# Patient Record
Sex: MALE | URBAN - METROPOLITAN AREA
[De-identification: names, ages, dates, MRNs, and addresses within clinical notes are randomized per-mention and may not be internally consistent; named-entity substitution may affect disease eponyms.]

---

## 2022-09-16 ENCOUNTER — EMERGENCY (EMERGENCY)
Facility: HOSPITAL | Age: 27
LOS: 1 days | Discharge: HOME | End: 2022-09-16
Payer: COMMERCIAL

## 2022-09-16 VITALS
HEART RATE: 86 BPM | SYSTOLIC BLOOD PRESSURE: 137 MMHG | OXYGEN SATURATION: 100 % | RESPIRATION RATE: 17 BRPM | DIASTOLIC BLOOD PRESSURE: 84 MMHG | WEIGHT: 175.05 LBS | HEIGHT: 65 IN | TEMPERATURE: 98 F

## 2022-09-16 PROCEDURE — 99284 EMERGENCY DEPT VISIT MOD MDM: CPT

## 2022-09-16 PROCEDURE — 99283 EMERGENCY DEPT VISIT LOW MDM: CPT

## 2022-09-16 RX ORDER — LIDOCAINE 4 G/100G
1 CREAM TOPICAL
Qty: 7 | Refills: 0
Start: 2022-09-16 | End: 2022-09-22

## 2022-09-16 RX ORDER — DIAZEPAM 5 MG
1 TABLET ORAL
Qty: 9 | Refills: 0
Start: 2022-09-16 | End: 2022-09-18

## 2022-09-16 RX ORDER — LIDOCAINE 4 G/100G
1 CREAM TOPICAL ONCE
Refills: 0 | Status: COMPLETED | OUTPATIENT
Start: 2022-09-16 | End: 2022-09-16

## 2022-09-16 RX ORDER — DIAZEPAM 5 MG
5 TABLET ORAL ONCE
Refills: 0 | Status: DISCONTINUED | OUTPATIENT
Start: 2022-09-16 | End: 2022-09-16

## 2022-09-16 RX ORDER — ACETAMINOPHEN 500 MG
975 TABLET ORAL ONCE
Refills: 0 | Status: COMPLETED | OUTPATIENT
Start: 2022-09-16 | End: 2022-09-16

## 2022-09-16 RX ADMIN — Medication 975 MILLIGRAM(S): at 13:46

## 2022-09-16 RX ADMIN — Medication 5 MILLIGRAM(S): at 12:56

## 2022-09-16 RX ADMIN — LIDOCAINE 1 PATCH: 4 CREAM TOPICAL at 12:57

## 2022-09-16 RX ADMIN — Medication 975 MILLIGRAM(S): at 12:56

## 2022-09-16 NOTE — ED PROVIDER NOTE - PHYSICAL EXAMINATION
GENERAL: uncomfortable appearing from pain.   HEAD:  Atraumatic, Normocephalic  EYES: EOMI, PERRLA, conjunctiva and sclera clear  ENT: MMM; oropharynx clear  NECK: Supple, No JVD  CHEST/LUNG: Clear to auscultation bilaterally; No wheeze  HEART: Regular rate and rhythm; No murmurs, rubs, or gallops  ABDOMEN: Soft, Nontender, Nondistended; Bowel sounds present  EXTREMITIES:  2+ Peripheral Pulses, No clubbing, cyanosis, or edema  PSYCH: AAOx3  NEUROLOGY: no focal motor or sensory deficits. 5/5 muscle strength in all extremities.   SKIN: No rashes or lesions  BACK: no midline spinal tenderness; exquisite tenderness to superior right gluteus and lower right back.

## 2022-09-16 NOTE — ED PROVIDER NOTE - CLINICAL SUMMARY MEDICAL DECISION MAKING FREE TEXT BOX
25 y/o M hx of HTN, HLD, anxiety, ADHD, Depression presenting with sudden onset of right lower back and buttock pain since yesterday.   Suspect spasm/radicular sciatic pain.   No worrisome features of cauda equina. Lower extremity strength preserved.  Plan for analgesics and antispasmodics and reassessment.

## 2022-09-16 NOTE — ED PROVIDER NOTE - PATIENT PORTAL LINK FT
You can access the FollowMyHealth Patient Portal offered by [facility] by registering at the following website: [email or web address]. By joining [employer]’s FollowMyHealth portal, you will also be able to view your health information using other applications (apps) compatible with our system.

## 2022-09-16 NOTE — ED ADULT NURSE NOTE - OBJECTIVE STATEMENT
States he has lower back pain radiating to right lower pain since yesterday .Denies injury . States he has lower back pain radiating to right lower pain since yesterday .Denies injury or fall.

## 2022-09-16 NOTE — ED PROVIDER NOTE - NSFOLLOWUPINSTRUCTIONS_ED_ALL_ED_FT
There are spine specialists within the [facility] system you may call [phone] for your back pain, call and arrange your follow up appointment.  Please also follow up with your primary care doctor.  Take Tylenol 650 mg every 6-8 hours or Motrin 600 mg every 8 hours as need for pain  Use lidocaine patch every 12-24 hours.   Take diazepam 5 mg every 8 hours as needed for breakthrough pain. Do not drive on this medication.  Return to the emergency department for any difficulty urinating or worsening weakness of your legs.

## 2022-09-16 NOTE — ED PROVIDER NOTE - PROGRESS NOTE DETAILS
Patient feels much improved. Will provide short course of antispasmodics and recommend follow up with primary care and spine specialist. Able to ambulate better.

## 2022-09-16 NOTE — ED PROVIDER NOTE - NSICDXPASTMEDICALHX_GEN_ALL_CORE_FT
PAST MEDICAL HISTORY:  Anxiety     Anxiety and depression     HTN (hypertension)     Hyperlipidemia

## 2022-09-19 PROBLEM — Z00.00 ENCOUNTER FOR PREVENTIVE HEALTH EXAMINATION: Status: ACTIVE | Noted: 2022-09-19

## 2022-09-19 PROBLEM — F41.9 ANXIETY DISORDER, UNSPECIFIED: Chronic | Status: ACTIVE | Noted: 2022-09-16

## 2022-09-19 PROBLEM — I10 ESSENTIAL (PRIMARY) HYPERTENSION: Chronic | Status: ACTIVE | Noted: 2022-09-16

## 2022-09-19 PROBLEM — E78.5 HYPERLIPIDEMIA, UNSPECIFIED: Chronic | Status: ACTIVE | Noted: 2022-09-16

## 2022-09-26 ENCOUNTER — NON-APPOINTMENT (OUTPATIENT)
Age: 27
End: 2022-09-26

## 2022-09-26 ENCOUNTER — APPOINTMENT (OUTPATIENT)
Dept: ORTHOPEDIC SURGERY | Facility: CLINIC | Age: 27
End: 2022-09-26

## 2022-09-26 DIAGNOSIS — M54.9 DORSALGIA, UNSPECIFIED: ICD-10-CM

## 2022-09-26 PROCEDURE — 72110 X-RAY EXAM L-2 SPINE 4/>VWS: CPT

## 2022-09-26 PROCEDURE — 99204 OFFICE O/P NEW MOD 45 MIN: CPT

## 2022-09-26 RX ORDER — IBUPROFEN 800 MG/1
800 TABLET, FILM COATED ORAL 3 TIMES DAILY
Qty: 28 | Refills: 0 | Status: ACTIVE | COMMUNITY
Start: 2022-09-26 | End: 1900-01-01

## 2022-09-26 RX ORDER — TIZANIDINE 2 MG/1
2 TABLET ORAL EVERY 6 HOURS
Qty: 24 | Refills: 1 | Status: ACTIVE | COMMUNITY
Start: 2022-09-26 | End: 1900-01-01

## 2022-09-26 NOTE — PHYSICAL EXAM
[de-identified] : Lumbar Physical Exam\par \par Gait - Normal\par \par Station - Normal\par \par Sagittal balance - Normal\par \par Compensatory mechanism? - None\par \par Heel walk - Normal\par \par Toe walk - Normal\par \par Reflexes\par Patellar - normal\par Gastroc - normal\par Clonus - No\par \par Hip Exam - Normal\par \par Straight leg raise - none\par \par Pulses - 2+ dp/pt\par \par Range of motion - normal\par \par Sensation \par Sensation intact to light touch in L1, L2, L3, L4, L5 and S1 dermatomes bilaterally\par \par Motor\par 	IP	Quad	HS	TA	Gastroc	EHL\par Right	3/5	5/5	5/5	5/5	5/5	5/5\par Left	5/5	5/5	5/5	5/5	5/5	5/5 [de-identified] : Lumbar radiographs\par No instability noted\par Facet arthropathy noted\par Mild disc degeneration noted

## 2022-09-26 NOTE — HISTORY OF PRESENT ILLNESS
[de-identified] : This is a 26-year-old male who is here for evaluation of his back and leg symptoms.  He has been dealing with the symptoms off and on for several years.  He describes pain which does go down his right posterior thigh posterior calf.  He denies any bowel bladder issues.  Denies any saddle anesthesia.  He does state that the symptoms can be disabling at times.  They have interfered with his ability perform his activity daily living.

## 2022-10-25 ENCOUNTER — APPOINTMENT (OUTPATIENT)
Dept: MRI IMAGING | Facility: IMAGING CENTER | Age: 27
End: 2022-10-25

## 2022-11-14 ENCOUNTER — APPOINTMENT (OUTPATIENT)
Dept: ORTHOPEDIC SURGERY | Facility: CLINIC | Age: 27
End: 2022-11-14

## 2024-12-10 ENCOUNTER — EMERGENCY (EMERGENCY)
Facility: HOSPITAL | Age: 29
LOS: 1 days | Discharge: HOME | End: 2024-12-10
Attending: EMERGENCY MEDICINE
Payer: SELF-PAY

## 2024-12-10 VITALS
HEART RATE: 150 BPM | HEIGHT: 64 IN | SYSTOLIC BLOOD PRESSURE: 141 MMHG | RESPIRATION RATE: 18 BRPM | OXYGEN SATURATION: 100 % | WEIGHT: 220.46 LBS | DIASTOLIC BLOOD PRESSURE: 65 MMHG | TEMPERATURE: 99 F

## 2024-12-10 LAB
ALBUMIN SERPL ELPH-MCNC: 3.9 G/DL — SIGNIFICANT CHANGE UP (ref 3.5–5)
ALP SERPL-CCNC: 94 U/L — SIGNIFICANT CHANGE UP (ref 40–120)
ALT FLD-CCNC: 66 U/L DA — HIGH (ref 10–60)
ANION GAP SERPL CALC-SCNC: 4 MMOL/L — LOW (ref 5–17)
ANISOCYTOSIS BLD QL: SLIGHT — SIGNIFICANT CHANGE UP
APTT BLD: 34 SEC — SIGNIFICANT CHANGE UP (ref 24.5–35.6)
AST SERPL-CCNC: 20 U/L — SIGNIFICANT CHANGE UP (ref 10–40)
BASOPHILS # BLD AUTO: 0.13 K/UL — SIGNIFICANT CHANGE UP (ref 0–0.2)
BASOPHILS NFR BLD AUTO: 1 % — SIGNIFICANT CHANGE UP (ref 0–2)
BILIRUB SERPL-MCNC: 0.4 MG/DL — SIGNIFICANT CHANGE UP (ref 0.2–1.2)
BUN SERPL-MCNC: 13 MG/DL — SIGNIFICANT CHANGE UP (ref 7–18)
CALCIUM SERPL-MCNC: 9.4 MG/DL — SIGNIFICANT CHANGE UP (ref 8.4–10.5)
CHLORIDE SERPL-SCNC: 108 MMOL/L — SIGNIFICANT CHANGE UP (ref 96–108)
CK SERPL-CCNC: 111 U/L — SIGNIFICANT CHANGE UP (ref 35–232)
CO2 SERPL-SCNC: 25 MMOL/L — SIGNIFICANT CHANGE UP (ref 22–31)
CREAT SERPL-MCNC: 1.03 MG/DL — SIGNIFICANT CHANGE UP (ref 0.5–1.3)
EGFR: 100 ML/MIN/1.73M2 — SIGNIFICANT CHANGE UP
EOSINOPHIL # BLD AUTO: 0.13 K/UL — SIGNIFICANT CHANGE UP (ref 0–0.5)
EOSINOPHIL NFR BLD AUTO: 1 % — SIGNIFICANT CHANGE UP (ref 0–6)
GLUCOSE SERPL-MCNC: 109 MG/DL — HIGH (ref 70–99)
HCT VFR BLD CALC: 44.9 % — SIGNIFICANT CHANGE UP (ref 39–50)
HGB BLD-MCNC: 15.4 G/DL — SIGNIFICANT CHANGE UP (ref 13–17)
INR BLD: 1.02 RATIO — SIGNIFICANT CHANGE UP (ref 0.85–1.16)
LYMPHOCYTES # BLD AUTO: 1.34 K/UL — SIGNIFICANT CHANGE UP (ref 1–3.3)
LYMPHOCYTES # BLD AUTO: 10 % — LOW (ref 13–44)
MANUAL SMEAR VERIFICATION: SIGNIFICANT CHANGE UP
MCHC RBC-ENTMCNC: 30.4 PG — SIGNIFICANT CHANGE UP (ref 27–34)
MCHC RBC-ENTMCNC: 34.3 G/DL — SIGNIFICANT CHANGE UP (ref 32–36)
MCV RBC AUTO: 88.7 FL — SIGNIFICANT CHANGE UP (ref 80–100)
MONOCYTES # BLD AUTO: 1.07 K/UL — HIGH (ref 0–0.9)
MONOCYTES NFR BLD AUTO: 8 % — SIGNIFICANT CHANGE UP (ref 2–14)
MYELOCYTES NFR BLD: 1 % — HIGH (ref 0–0)
NEUTROPHILS # BLD AUTO: 9.35 K/UL — HIGH (ref 1.8–7.4)
NEUTROPHILS NFR BLD AUTO: 70 % — SIGNIFICANT CHANGE UP (ref 43–77)
NRBC # BLD: 0 /100 WBCS — SIGNIFICANT CHANGE UP (ref 0–0)
PLAT MORPH BLD: NORMAL — SIGNIFICANT CHANGE UP
PLATELET # BLD AUTO: 204 K/UL — SIGNIFICANT CHANGE UP (ref 150–400)
POIKILOCYTOSIS BLD QL AUTO: SLIGHT — SIGNIFICANT CHANGE UP
POTASSIUM SERPL-MCNC: 4 MMOL/L — SIGNIFICANT CHANGE UP (ref 3.5–5.3)
POTASSIUM SERPL-SCNC: 4 MMOL/L — SIGNIFICANT CHANGE UP (ref 3.5–5.3)
PROT SERPL-MCNC: 8.3 G/DL — SIGNIFICANT CHANGE UP (ref 6–8.3)
PROTHROM AB SERPL-ACNC: 11.9 SEC — SIGNIFICANT CHANGE UP (ref 9.9–13.4)
RBC # BLD: 5.06 M/UL — SIGNIFICANT CHANGE UP (ref 4.2–5.8)
RBC # FLD: 12.2 % — SIGNIFICANT CHANGE UP (ref 10.3–14.5)
RBC BLD AUTO: ABNORMAL
SODIUM SERPL-SCNC: 137 MMOL/L — SIGNIFICANT CHANGE UP (ref 135–145)
T4 AB SER-ACNC: 11.1 UG/DL — SIGNIFICANT CHANGE UP (ref 4.6–12)
TROPONIN I, HIGH SENSITIVITY RESULT: 3.6 NG/L — SIGNIFICANT CHANGE UP
TSH SERPL-MCNC: 0.93 UU/ML — SIGNIFICANT CHANGE UP (ref 0.34–4.82)
VARIANT LYMPHS # BLD: 9 % — HIGH (ref 0–6)
WBC # BLD: 13.36 K/UL — HIGH (ref 3.8–10.5)
WBC # FLD AUTO: 13.36 K/UL — HIGH (ref 3.8–10.5)

## 2024-12-10 PROCEDURE — 36415 COLL VENOUS BLD VENIPUNCTURE: CPT

## 2024-12-10 PROCEDURE — 73630 X-RAY EXAM OF FOOT: CPT

## 2024-12-10 PROCEDURE — 93971 EXTREMITY STUDY: CPT | Mod: 26,LT

## 2024-12-10 PROCEDURE — 84443 ASSAY THYROID STIM HORMONE: CPT

## 2024-12-10 PROCEDURE — 84439 ASSAY OF FREE THYROXINE: CPT

## 2024-12-10 PROCEDURE — 85730 THROMBOPLASTIN TIME PARTIAL: CPT

## 2024-12-10 PROCEDURE — 96375 TX/PRO/DX INJ NEW DRUG ADDON: CPT

## 2024-12-10 PROCEDURE — 93005 ELECTROCARDIOGRAM TRACING: CPT

## 2024-12-10 PROCEDURE — 80053 COMPREHEN METABOLIC PANEL: CPT

## 2024-12-10 PROCEDURE — 82550 ASSAY OF CK (CPK): CPT

## 2024-12-10 PROCEDURE — 99285 EMERGENCY DEPT VISIT HI MDM: CPT

## 2024-12-10 PROCEDURE — 93971 EXTREMITY STUDY: CPT

## 2024-12-10 PROCEDURE — 73610 X-RAY EXAM OF ANKLE: CPT

## 2024-12-10 PROCEDURE — 85610 PROTHROMBIN TIME: CPT

## 2024-12-10 PROCEDURE — 96374 THER/PROPH/DIAG INJ IV PUSH: CPT

## 2024-12-10 PROCEDURE — 73610 X-RAY EXAM OF ANKLE: CPT | Mod: 26,LT

## 2024-12-10 PROCEDURE — 84484 ASSAY OF TROPONIN QUANT: CPT

## 2024-12-10 PROCEDURE — 84436 ASSAY OF TOTAL THYROXINE: CPT

## 2024-12-10 PROCEDURE — 84480 ASSAY TRIIODOTHYRONINE (T3): CPT

## 2024-12-10 PROCEDURE — 73630 X-RAY EXAM OF FOOT: CPT | Mod: 26,LT

## 2024-12-10 PROCEDURE — 85025 COMPLETE CBC W/AUTO DIFF WBC: CPT

## 2024-12-10 PROCEDURE — 99285 EMERGENCY DEPT VISIT HI MDM: CPT | Mod: 25

## 2024-12-10 RX ORDER — KETOROLAC TROMETHAMINE 30 MG/ML
15 INJECTION INTRAMUSCULAR; INTRAVENOUS ONCE
Refills: 0 | Status: DISCONTINUED | OUTPATIENT
Start: 2024-12-10 | End: 2024-12-10

## 2024-12-10 RX ORDER — ACETAMINOPHEN 500MG 500 MG/1
650 TABLET, COATED ORAL ONCE
Refills: 0 | Status: COMPLETED | OUTPATIENT
Start: 2024-12-10 | End: 2024-12-10

## 2024-12-10 RX ORDER — OXYCODONE AND ACETAMINOPHEN 5; 325 MG/1; MG/1
1 TABLET ORAL ONCE
Refills: 0 | Status: DISCONTINUED | OUTPATIENT
Start: 2024-12-10 | End: 2024-12-10

## 2024-12-10 RX ADMIN — OXYCODONE AND ACETAMINOPHEN 1 TABLET(S): 5; 325 TABLET ORAL at 20:16

## 2024-12-10 RX ADMIN — KETOROLAC TROMETHAMINE 15 MILLIGRAM(S): 30 INJECTION INTRAMUSCULAR; INTRAVENOUS at 22:10

## 2024-12-10 RX ADMIN — OXYCODONE AND ACETAMINOPHEN 1 TABLET(S): 5; 325 TABLET ORAL at 22:44

## 2024-12-10 RX ADMIN — ACETAMINOPHEN 500MG 260 MILLIGRAM(S): 500 TABLET, COATED ORAL at 22:43

## 2024-12-10 NOTE — ED ADULT TRIAGE NOTE - NS ED NURSE BANDS TYPE
-Call to schedule ENT appointment. Dr. Jerrell Villegas [phone] or DR. Francisco Vázquez [phone]    -Eat 5 servings of fruits and vegetables daily  -Play for 1 hour daily ([email or web address])  -Limit Screen time to 2 hours or less    
Name band;

## 2024-12-10 NOTE — ED ADULT NURSE NOTE - NSFALLRISKINTERV_ED_ALL_ED

## 2024-12-10 NOTE — ED ADULT TRIAGE NOTE - CHIEF COMPLAINT QUOTE
Left ankle pain x 2 days. Denies fall/trauma. Pt tachycardic on arrival denies CP, SOB. Left ankle pain, redness and swelling x 2 days. Denies fall/trauma. Pt tachycardic on arrival denies CP, SOB.

## 2024-12-10 NOTE — ED ADULT NURSE NOTE - CHIEF COMPLAINT QUOTE
Left ankle pain, redness and swelling x 2 days. Denies fall/trauma. Pt tachycardic on arrival denies CP, SOB.

## 2024-12-10 NOTE — ED ADULT NURSE NOTE - OBJECTIVE STATEMENT
Patient presented to ED c/o left ankle pain, swelling for 2 days. patient denies fall and trauma. Patient tachycardiac upon arrival to ED- denies chest pain, difficulty breathing. Patient anxious and in sever pain to left ankle. has hx of anxiety per patient.

## 2024-12-10 NOTE — ED ADULT NURSE NOTE - NURSING MUSC JOINTS
left ankle swelling/yes (describe) no abrasions, no jaundice, no lesions, no pruritis, and no rashes.

## 2024-12-11 VITALS
TEMPERATURE: 99 F | SYSTOLIC BLOOD PRESSURE: 143 MMHG | RESPIRATION RATE: 18 BRPM | HEART RATE: 114 BPM | OXYGEN SATURATION: 98 % | DIASTOLIC BLOOD PRESSURE: 87 MMHG

## 2024-12-11 LAB
T3 SERPL-MCNC: 137 NG/DL — SIGNIFICANT CHANGE UP (ref 80–200)
T4 FREE SERPL-MCNC: 1.2 NG/DL — SIGNIFICANT CHANGE UP (ref 0.9–1.7)

## 2024-12-11 RX ORDER — OXYCODONE AND ACETAMINOPHEN 5; 325 MG/1; MG/1
1 TABLET ORAL
Qty: 6 | Refills: 0
Start: 2024-12-11

## 2024-12-11 RX ORDER — IBUPROFEN 200 MG
1 TABLET ORAL
Qty: 30 | Refills: 0
Start: 2024-12-11

## 2024-12-11 NOTE — ED PROVIDER NOTE - PATIENT PORTAL LINK FT
You can access the FollowMyHealth Patient Portal offered by [facility] by registering at the following website: [email or web address]. By joining [employer]’s FollowMyHealth portal, you will also be able to view your health information using other applications (apps) compatible with our system.

## 2024-12-11 NOTE — ED PROVIDER NOTE - NSFOLLOWUPCLINICS_GEN_ALL_ED_FT
[facility]  Podiatry/Wound Care  [address] [place] [address]  Phone: [phone]  Fax: [phone]  Follow Up Time: 1-3 Days

## 2024-12-11 NOTE — ED PROVIDER NOTE - OBJECTIVE STATEMENT
29-year-old male with anxiety presents with left ankle pain.  Patient was stretching yesterday in bed and felt as if there popped in his ankle.  After that he developed worsening pain and swelling.  He has been taking ibuprofen with no relief

## 2024-12-11 NOTE — ED PROVIDER NOTE - CLINICAL SUMMARY MEDICAL DECISION MAKING FREE TEXT BOX
Patient with left ankle pain tachycardia.  Pain control administered duplex and x-ray unremarkable.  Labs nonactionable.  Pain is now controlled patient states he is feeling much better.  Tachycardia resolved repeat heart rate 100.  Home with Ace wrap crutches podiatry follow-up

## 2024-12-11 NOTE — ED PROVIDER NOTE - PHYSICAL EXAMINATION
tachycardic anxious appearing abdomen nontender mild lateral malleolus swelling.  No erythema.  No calf tenderness to palpation.  No midfoot tenderness palpation.  2+ DP pulse.

## 2024-12-11 NOTE — ED PROVIDER NOTE - NSFOLLOWUPINSTRUCTIONS_ED_ALL_ED_FT
Ankle Sprain  Illustration of an ankle sprain caused by a foot turning outward and a foot turning inward.  An ankle sprain is a stretch or tear in a ligament in the ankle. Ligaments are tissues that connect bones to each other.    The two most common types of ankle sprains are:  Inversion sprain. This happens when the foot turns inward and the ankle rolls outward. It affects the ligament on the outside of the foot (lateral ligament).  Eversion sprain. This happens when the foot turns outward and the ankle rolls inward. It affects the ligament on the inner side of the foot (medial ligament).  What are the causes?  An ankle sprain is often caused by rolling or twisting the ankle by accident.    What increases the risk?  You are more likely to get an ankle sprain if you play sports.    What are the signs or symptoms?  Comparison of a normal ankle and an ankle that is swollen and bruised.  Symptoms of an ankle sprain include:  Pain in your ankle.  Swelling.  Bruising. Bruises may form right after you sprain your ankle or 1–2 days later.  Trouble standing or walking. This includes trouble turning or changing directions.  How is this diagnosed?  An ankle sprain is diagnosed with a physical exam. Your health care provider will press on parts of your foot and ankle and try to move them in certain ways.    You may also have X-rays taken. These may be done to see how severe the sprain is and to check for broken bones.    How is this treated?  An ankle sprain may be treated with:  A brace or splint. This is used to keep the ankle from moving until it heals.  An elastic bandage (dressing). This is used to support the ankle.  Crutches.  Pain medicine.  Surgery. This may be needed if the sprain is severe.  Physical therapy. This may help to improve the range of motion in the ankle.  Follow these instructions at home:  If you have a removable brace or a splint:    Wear the brace or splint as told by your provider. Remove it only as told by your provider.  Check the skin around the brace or splint every day. Tell your provider about any concerns.  Loosen the brace or splint if your toes tingle, become numb, or turn cold and blue.  Keep the brace or splint clean.  If the brace or splint is not waterproof:  Do not let it get wet.  Cover it with a watertight covering when you take a bath or a shower.  If you have an elastic dressing:    Take the dressing off to shower or bathe.  If the dressing feels too tight, adjust it to make it more comfortable.  Loosen the dressing if your foot tingles, becomes numb, or turns cold and blue.  Managing pain, stiffness, and swelling    If told, put ice on the affected area.  If you have a removable brace or splint, remove it as told by your provider.  Put ice in a plastic bag.  Place a towel between your skin and the bag.  Leave the ice on for 20 minutes, 2–3 times a day.  Remove the ice if your skin turns bright red. This is very important. If you cannot feel pain, heat, or cold, you have a greater risk of damage to the area.  If your skin turns bright red, remove the ice right away to prevent skin damage. The risk of damage is higher if you cannot feel pain, heat, or cold.  Move your toes often to reduce stiffness and swelling.  For 2–3 days, raise (elevate) your ankle above the level of your heart while you are sitting or lying down.  General instructions    Take over-the-counter and prescription medicines only as told by your provider.  Do not use any products that contain nicotine or tobacco. These products include cigarettes, chewing tobacco, and vaping devices, such as e-cigarettes. If you need help quitting, ask your provider.  Rest your ankle.  Do not use your ankle to support your body weight until your provider says that you can. Use crutches as told by your provider.  Ask your provider when it is safe to drive if you have a brace or splint on your ankle.  Contact a health care provider if:  You have bruising or swelling that get worse all of a sudden.  Your pain does not get better with medicine.  Get help right away if:  Your foot or toes become numb or blue.  You have severe pain that gets worse.  This information is not intended to replace advice given to you by your health care provider. Make sure you discuss any questions you have with your health care provider.    Document Revised: 09/20/2023 Document Reviewed: 09/20/2023  [employer] Patient Education © 2024 [employer]

## 2025-01-31 ENCOUNTER — EMERGENCY (EMERGENCY)
Facility: HOSPITAL | Age: 30
LOS: 1 days | Discharge: HOME | End: 2025-01-31
Attending: STUDENT IN AN ORGANIZED HEALTH CARE EDUCATION/TRAINING PROGRAM
Payer: MEDICAID

## 2025-01-31 VITALS
HEART RATE: 104 BPM | OXYGEN SATURATION: 98 % | TEMPERATURE: 98 F | DIASTOLIC BLOOD PRESSURE: 80 MMHG | RESPIRATION RATE: 16 BRPM | WEIGHT: 224.87 LBS | HEIGHT: 64 IN | SYSTOLIC BLOOD PRESSURE: 147 MMHG

## 2025-01-31 PROCEDURE — 99284 EMERGENCY DEPT VISIT MOD MDM: CPT

## 2025-01-31 PROCEDURE — 99283 EMERGENCY DEPT VISIT LOW MDM: CPT | Mod: 25

## 2025-01-31 PROCEDURE — 71046 X-RAY EXAM CHEST 2 VIEWS: CPT | Mod: 26

## 2025-01-31 PROCEDURE — 71046 X-RAY EXAM CHEST 2 VIEWS: CPT

## 2025-01-31 RX ORDER — IBUPROFEN 600 MG/1
600 TABLET, FILM COATED ORAL ONCE
Refills: 0 | Status: COMPLETED | OUTPATIENT
Start: 2025-01-31 | End: 2025-01-31

## 2025-01-31 RX ADMIN — IBUPROFEN 600 MILLIGRAM(S): 600 TABLET, FILM COATED ORAL at 22:18

## 2025-01-31 NOTE — ED PROVIDER NOTE - OBJECTIVE STATEMENT
28 y/o M with no sig PMH who prevents for L chest wall tenderness    Patient states that his spouse noted today chest wall mass, patient has some tenderness to palpation of the area. No shortness of breath.--->Denies medical history or allergies.

## 2025-01-31 NOTE — ED ADULT NURSE NOTE - OBJECTIVE STATEMENT
Patient is a 28y/o male presenting to the ED c/o I have lump on my left side of chest  and hurts when I breath since last night

## 2025-01-31 NOTE — ED PROVIDER NOTE - PATIENT PORTAL LINK FT
You can access the FollowMyHealth Patient Portal offered by [facility] by registering at the following website: [email or web address]. By joining [employer]’s FollowMyHealth portal, you will also be able to view your health information using other applications (apps) compatible with our system.

## 2025-01-31 NOTE — ED PROVIDER NOTE - PHYSICAL EXAMINATION
GENERAL: no acute distress; well-developed  HEAD:  Atraumatic, Normocephalic  EYES: EOMI, PERRLA, conjunctiva and sclera clear  ENT: MMM; oropharynx clear  NECK: Supple, No JVD  CHEST/LUNG: Clear to auscultation bilaterally; L chest wall mobile, rubbery mass.   HEART: Regular rate and rhythm; No murmurs, rubs, or gallops  ABDOMEN: Soft, Nontender, Nondistended; Bowel sounds present  EXTREMITIES:  2+ Peripheral Pulses, No clubbing, cyanosis, or edema  PSYCH: AAOx3  NEUROLOGY: no focal motor or sensory deficits. 5/5 muscle strength in all extremities.   SKIN: No rashes or lesions

## 2025-01-31 NOTE — ED PROVIDER NOTE - CLINICAL SUMMARY MEDICAL DECISION MAKING FREE TEXT BOX
30 y/o M with no sig PMH who prevents for L chest wall tenderness  Palpable, rubbery mass  Suspect lipoma  Will obtain CXR  Offered CT chest, but informed patient MR would be better test and will follow up with his PCP  NSAIDs PRN 28 y/o M with no sig PMH who prevents for L chest wall tenderness  Palpable, rubbery mass  Suspect lipoma  Will obtain CXR  Offered CT chest, but informed patient MR would be better test and will follow up with his PCP  NSAIDs PRN  Given there is some pain with palpation that is atypical with lipoma, highly recommend advanced imaging. Patient informed he can return to ER for CT scan if he is unable to obtain MRI in timely manner.

## 2025-01-31 NOTE — ED ADULT NURSE NOTE - PAIN: BODY LOCATION
[employer] message forwarded to parent with advice and telephone number for our dieticians. generalized

## 2025-01-31 NOTE — ED PROVIDER NOTE - NSFOLLOWUPINSTRUCTIONS_ED_ALL_ED_FT
Continue ibuprofen as needed  Follow up with your primary care doctor to obtain MRI of chest  Seek Medical attention or return to the emergency department for any new or worsening symptoms.

## 2025-02-28 NOTE — ED PROVIDER NOTE - OBJECTIVE STATEMENT
Opt out 27 y/o M hx of HTN, HLD, anxiety, ADHD, Depression presenting with sudden onset of right lower back and buttock pain since yesterday.     Patient states that pain started acutely. 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   He continues to feel sharp pain with radiating electrical sensation down right leg. No bladder/bowel retention or incontinence or saddle anesthesia. No IVDA.  No hx of chronic back pain or trauma. Last took ibuprofen at 10:00AM 600 mg.

## 2025-04-04 ENCOUNTER — EMERGENCY (EMERGENCY)
Facility: HOSPITAL | Age: 30
LOS: 1 days | Discharge: HOME | End: 2025-04-04
Attending: EMERGENCY MEDICINE
Payer: SELF-PAY

## 2025-04-04 VITALS
SYSTOLIC BLOOD PRESSURE: 130 MMHG | OXYGEN SATURATION: 99 % | RESPIRATION RATE: 18 BRPM | DIASTOLIC BLOOD PRESSURE: 81 MMHG | HEART RATE: 99 BPM | TEMPERATURE: 98 F

## 2025-04-04 VITALS
HEART RATE: 115 BPM | DIASTOLIC BLOOD PRESSURE: 74 MMHG | OXYGEN SATURATION: 97 % | RESPIRATION RATE: 18 BRPM | SYSTOLIC BLOOD PRESSURE: 125 MMHG | WEIGHT: 195.11 LBS | TEMPERATURE: 98 F | HEIGHT: 66 IN

## 2025-04-04 PROCEDURE — 72128 CT CHEST SPINE W/O DYE: CPT | Mod: MC

## 2025-04-04 PROCEDURE — 99284 EMERGENCY DEPT VISIT MOD MDM: CPT | Mod: 25

## 2025-04-04 PROCEDURE — 72125 CT NECK SPINE W/O DYE: CPT | Mod: MC

## 2025-04-04 PROCEDURE — 72125 CT NECK SPINE W/O DYE: CPT | Mod: 26

## 2025-04-04 PROCEDURE — 72128 CT CHEST SPINE W/O DYE: CPT | Mod: 26

## 2025-04-04 PROCEDURE — 70450 CT HEAD/BRAIN W/O DYE: CPT | Mod: MC

## 2025-04-04 PROCEDURE — 70450 CT HEAD/BRAIN W/O DYE: CPT | Mod: 26

## 2025-04-04 PROCEDURE — 99284 EMERGENCY DEPT VISIT MOD MDM: CPT

## 2025-04-04 RX ORDER — DIAZEPAM 2 MG/1
5 TABLET ORAL ONCE
Refills: 0 | Status: DISCONTINUED | OUTPATIENT
Start: 2025-04-04 | End: 2025-04-04

## 2025-04-04 RX ORDER — IBUPROFEN 200 MG
600 TABLET ORAL ONCE
Refills: 0 | Status: COMPLETED | OUTPATIENT
Start: 2025-04-04 | End: 2025-04-04

## 2025-04-04 RX ORDER — CYCLOBENZAPRINE HYDROCHLORIDE 15 MG/1
1 CAPSULE, EXTENDED RELEASE ORAL
Qty: 15 | Refills: 0
Start: 2025-04-04

## 2025-04-04 RX ADMIN — DIAZEPAM 5 MILLIGRAM(S): 2 TABLET ORAL at 17:58

## 2025-04-04 RX ADMIN — Medication 600 MILLIGRAM(S): at 17:58

## 2025-04-04 NOTE — ED PROVIDER NOTE - PATIENT PORTAL LINK FT
You can access the FollowMyHealth Patient Portal offered by [facility] by registering at the following website: [email or web address]. By joining [employer]’s FollowMyHealth portal, you will also be able to view your health information using other applications (apps) compatible with our system.
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
## 2025-04-04 NOTE — ED PROVIDER NOTE - NSFOLLOWUPINSTRUCTIONS_ED_ALL_ED_FT
Cervical Strain    WHAT YOU NEED TO KNOW:    What is a cervical strain? A cervical strain is a stretched or torn muscle or tendon in your neck. Tendons are strong tissues that connect muscles to bones.    What are the signs and symptoms of a cervical strain?    Pain and stiffness    Limited movement    Headache    Swelling or bruising    Ringing in the ears    Dizziness or vertigo  How is a cervical strain diagnosed? Your healthcare provider will check your movement, balance, and strength. An x-ray, CT, or MRI may show the injury. You may be given contrast liquid to help your neck show up better in the pictures. Tell the healthcare provider if you have ever had an allergic reaction to contrast liquid. Do not enter the MRI room with anything metal. Metal can cause serious injury. Tell the healthcare provider if you have any metal in or on your body.    How is a cervical strain treated? You may need any of the following:    Acetaminophen decreases pain and fever. It is available without a doctor's order. Ask how much to take and how often to take it. Follow directions. Read the labels of all other medicines you are using to see if they also contain acetaminophen, or ask your doctor or pharmacist. Acetaminophen can cause liver damage if not taken correctly.    NSAIDs, such as ibuprofen, help decrease swelling, pain, and fever. This medicine is available with or without a doctor's order. NSAIDs can cause stomach bleeding or kidney problems in certain people. If you take blood thinner medicine, always ask your healthcare provider if NSAIDs are safe for you. Always read the medicine label and follow directions.    Muscle relaxers help decrease pain and muscle spasms.    Prescription pain medicine may be given. Ask your healthcare provider how to take this medicine safely. Some prescription pain medicines contain acetaminophen. Do not take other medicines that contain acetaminophen without talking to your healthcare provider. Too much acetaminophen may cause liver damage. Prescription pain medicine may cause constipation. Ask your healthcare provider how to prevent or treat constipation.  How can I manage my symptoms?    Apply heat on your neck for 15 to 20 minutes, 4 to 6 times a day or as directed. Heat helps decrease pain, stiffness, and muscle spasms.    Begin gentle neck exercises as soon as you can move your neck without pain. Exercises will help decrease stiffness and improve the strength and movement of your neck. Ask your healthcare provider what kind of exercises you should do.    Gradually return to your usual activities as directed. Stop if you have pain. Avoid activities that can cause more damage to your neck, such as heavy lifting or strenuous exercise.    Sleep without a pillow to help decrease pain. Instead, roll a small towel tightly and place it under your neck.    Go to physical therapy as directed. A physical therapist teaches you exercises to help improve movement and strength, and to decrease pain.  Prevent another neck injury:    Drive safely. Make sure everyone in your car wears a seatbelt. A seatbelt can save your life if you are in an accident. Do not use your cell phone when you are driving. This could distract you and cause an accident. Pull over if you need to make a call or send a text message.    Wear helmets, lifejackets, and protective gear. Always wear a helmet when you ride a bike or motorcycle, go skiing, or play sports that could cause a head injury. Wear protective equipment when you play sports. Wear a lifejacket when you are on a boat or doing water sports.  When should I seek immediate care?    You have pain or numbness from your shoulder down to your hand.    You have problems with your vision, hearing, or balance.    You feel confused or cannot concentrate.    You have problems with movement and strength.  When should I call my doctor?    You have increased swelling or pain in your neck.    You have questions or concerns about your condition or care.  CARE AGREEMENT:    You have the right to help plan your care. Learn about your health condition and how it may be treated. Discuss treatment options with your healthcare providers to decide what care you want to receive. You always have the right to refuse treatment.    © [employer] 1973, 2025
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
